# Patient Record
Sex: FEMALE | Race: WHITE | NOT HISPANIC OR LATINO | URBAN - METROPOLITAN AREA
[De-identification: names, ages, dates, MRNs, and addresses within clinical notes are randomized per-mention and may not be internally consistent; named-entity substitution may affect disease eponyms.]

---

## 2020-08-12 ENCOUNTER — EMERGENCY (EMERGENCY)
Facility: HOSPITAL | Age: 85
LOS: 1 days | Discharge: HOME | End: 2020-08-12
Attending: EMERGENCY MEDICINE
Payer: MEDICARE

## 2020-08-12 VITALS
TEMPERATURE: 98 F | SYSTOLIC BLOOD PRESSURE: 123 MMHG | WEIGHT: 197.98 LBS | HEIGHT: 61 IN | DIASTOLIC BLOOD PRESSURE: 76 MMHG | HEART RATE: 71 BPM | OXYGEN SATURATION: 95 % | RESPIRATION RATE: 18 BRPM

## 2020-08-12 PROCEDURE — 99220: CPT

## 2020-08-12 PROCEDURE — 70450 CT HEAD/BRAIN W/O DYE: CPT | Mod: 26

## 2020-08-12 PROCEDURE — 72125 CT NECK SPINE W/O DYE: CPT | Mod: 26

## 2020-08-12 RX ORDER — METOPROLOL TARTRATE 50 MG
50 TABLET ORAL DAILY
Refills: 0 | Status: DISCONTINUED | OUTPATIENT
Start: 2020-08-12 | End: 2020-08-17

## 2020-08-12 RX ORDER — MONTELUKAST 4 MG/1
10 TABLET, CHEWABLE ORAL DAILY
Refills: 0 | Status: DISCONTINUED | OUTPATIENT
Start: 2020-08-12 | End: 2020-08-17

## 2020-08-12 RX ORDER — RALOXIFENE HYDROCHLORIDE 60 MG/1
60 TABLET, COATED ORAL DAILY
Refills: 0 | Status: DISCONTINUED | OUTPATIENT
Start: 2020-08-12 | End: 2020-08-17

## 2020-08-12 RX ORDER — QUETIAPINE FUMARATE 200 MG/1
100 TABLET, FILM COATED ORAL AT BEDTIME
Refills: 0 | Status: DISCONTINUED | OUTPATIENT
Start: 2020-08-12 | End: 2020-08-17

## 2020-08-12 RX ORDER — SERTRALINE 25 MG/1
50 TABLET, FILM COATED ORAL DAILY
Refills: 0 | Status: DISCONTINUED | OUTPATIENT
Start: 2020-08-12 | End: 2020-08-17

## 2020-08-12 RX ORDER — AMLODIPINE BESYLATE 2.5 MG/1
10 TABLET ORAL DAILY
Refills: 0 | Status: DISCONTINUED | OUTPATIENT
Start: 2020-08-12 | End: 2020-08-17

## 2020-08-12 RX ORDER — LEVOTHYROXINE SODIUM 125 MCG
112 TABLET ORAL DAILY
Refills: 0 | Status: DISCONTINUED | OUTPATIENT
Start: 2020-08-12 | End: 2020-08-17

## 2020-08-12 RX ORDER — LORATADINE 10 MG/1
10 TABLET ORAL DAILY
Refills: 0 | Status: DISCONTINUED | OUTPATIENT
Start: 2020-08-12 | End: 2020-08-17

## 2020-08-12 RX ORDER — ACETAMINOPHEN 500 MG
650 TABLET ORAL ONCE
Refills: 0 | Status: COMPLETED | OUTPATIENT
Start: 2020-08-12 | End: 2020-08-12

## 2020-08-12 RX ORDER — FERROUS SULFATE 325(65) MG
325 TABLET ORAL DAILY
Refills: 0 | Status: DISCONTINUED | OUTPATIENT
Start: 2020-08-12 | End: 2020-08-17

## 2020-08-12 RX ORDER — HALOPERIDOL DECANOATE 100 MG/ML
1 INJECTION INTRAMUSCULAR ONCE
Refills: 0 | Status: DISCONTINUED | OUTPATIENT
Start: 2020-08-12 | End: 2020-08-12

## 2020-08-12 RX ORDER — PANTOPRAZOLE SODIUM 20 MG/1
40 TABLET, DELAYED RELEASE ORAL
Refills: 0 | Status: DISCONTINUED | OUTPATIENT
Start: 2020-08-12 | End: 2020-08-17

## 2020-08-12 RX ORDER — SIMVASTATIN 20 MG/1
20 TABLET, FILM COATED ORAL AT BEDTIME
Refills: 0 | Status: DISCONTINUED | OUTPATIENT
Start: 2020-08-12 | End: 2020-08-17

## 2020-08-12 RX ORDER — DULOXETINE HYDROCHLORIDE 30 MG/1
60 CAPSULE, DELAYED RELEASE ORAL DAILY
Refills: 0 | Status: DISCONTINUED | OUTPATIENT
Start: 2020-08-12 | End: 2020-08-17

## 2020-08-12 RX ADMIN — QUETIAPINE FUMARATE 100 MILLIGRAM(S): 200 TABLET, FILM COATED ORAL at 21:32

## 2020-08-12 RX ADMIN — Medication 1 MILLIGRAM(S): at 21:32

## 2020-08-12 RX ADMIN — Medication 1 MILLIGRAM(S): at 11:40

## 2020-08-12 RX ADMIN — Medication 650 MILLIGRAM(S): at 10:23

## 2020-08-12 RX ADMIN — Medication 1 MILLIGRAM(S): at 18:54

## 2020-08-12 RX ADMIN — SIMVASTATIN 20 MILLIGRAM(S): 20 TABLET, FILM COATED ORAL at 21:33

## 2020-08-12 NOTE — ED ADULT TRIAGE NOTE - CHIEF COMPLAINT QUOTE
pt BIBA from [facility] s/p unwitnessed mechanical fall from standing height. pt states she tripped and fell and hit the back of her head. but denies pain at this time, denies loc, denies blood thinners. baseline mental status at this time. no external signs of injury or trauma.

## 2020-08-12 NOTE — ED CDU PROVIDER INITIAL DAY NOTE - DETAILS
85 year old female with PMHx HTN, HLD, schizoaffective presents to the ED s/p fall. CT head and neck neg. Pending COVID for placement back to facility.

## 2020-08-12 NOTE — ED ADULT NURSE NOTE - CAS ELECT INFOMATION PROVIDED
DC instructions/DC instructions faxed by FIOR NORRIS. Patient calm and cooperative. Transferred via stretcher.

## 2020-08-12 NOTE — ED ADULT NURSE NOTE - OBJECTIVE STATEMENT
pt perri from [facility] s/p a trip and fall this morning, hitting the back of her head, denies loc, no obv deformities or active bleeding noted. a+ox2-3 pleasantly confused, denies any other complaints at this time.

## 2020-08-12 NOTE — ED PROVIDER NOTE - PROGRESS NOTE DETAILS
CT head and cervical spine negative for acute fracture. will place in Obs pending COVID-19 result. 05-Jan-2017

## 2020-08-12 NOTE — ED ADULT NURSE NOTE - CHIEF COMPLAINT QUOTE
pt BIBA from [facility] s/p unwitnessed mechanical fall from standing height. pt states she tripped and fell and hit the back of her head. but denies pain at this time, denies loc, denies blood thinners. baseline mental status at this time. no external signs of injury or trauma.

## 2020-08-12 NOTE — ED PROVIDER NOTE - NS ED ROS FT
Review of Systems  •	CONSTITUTIONAL - no  fever, no diaphoresis, no weight change  •	SKIN - no rash  •	HEMATOLOGIC - no bleeding, no bruising  •	EYES - no eye pain, no blurred vision  •	ENT - no change in hearing, no pain  •	RESPIRATORY - no shortness of breath, no cough  •	CARDIAC - no chest pain, no palpitations  •	GI - no abd pain, no nausea, no vomiting, no diarrhea, no constipation, no bleeding  •	GENITO-URINARY - no discharge, no dysuria; no hematuria,   •	ENDO - no polydipsia, no polyuria, no heat/no cold intolerance  •	MUSCULOSKELETAL - no joint pain, no swelling, no redness  •	NEUROLOGIC - no weakness, (+)  headache, no anesthesia, no paresthesias  •	PSYCH - no anxiety, non suicidal, non homicidal, no hallucination, no depression

## 2020-08-12 NOTE — ED ADULT NURSE NOTE - CHPI ED NUR SYMPTOMS NEG
no bleeding/no confusion/no deformity/no tingling/no fever/no loss of consciousness/no numbness/no vomiting/no weakness

## 2020-08-12 NOTE — ED ADULT NURSE NOTE - PMH
Anemia    Depression    Dyslipidemia    Elevated cholesterol    HTN (hypertension)    Insomnia    Schizoaffective disorder

## 2020-08-12 NOTE — ED CDU PROVIDER INITIAL DAY NOTE - ATTENDING CONTRIBUTION TO CARE
I, Deangelo Manzanares, performed the initial face to face bedside interview with this patient regarding history of present illness, review of symptoms and relevant past medical, social and family history.  I completed an independent physical examination.  I was the initial provider who evaluated this patient. I have signed out the follow up of any pending tests (i.e. labs, radiological studies) to the ACP.  I have communicated the patient’s plan of care and disposition with the ACP.

## 2020-08-12 NOTE — ED CDU PROVIDER INITIAL DAY NOTE - OBJECTIVE STATEMENT
86 yo F, sent in from [facility] assisted [facility], hx of HTN, hypothyroid, anemia, HDL, insomnia, depression, schizoaffective, osteopetrosis p/w headache s/p mechanical fall today. Patient denies chest pain or dizziness. no loc. not on blood thinner per med list. DNR per living will.

## 2020-08-12 NOTE — ED ADULT NURSE NOTE - NSIMPLEMENTINTERV_GEN_ALL_ED
Implemented All Fall with Harm Risk Interventions:  [place] to call system. Call bell, personal items and telephone within reach. Instruct patient to call for assistance. Room bathroom lighting operational. Non-slip footwear when patient is off stretcher. Physically safe environment: no spills, clutter or unnecessary equipment. Stretcher in lowest position, wheels locked, appropriate side rails in place. Provide visual cue, wrist band, yellow gown, etc. Monitor gait and stability. Monitor for mental status changes and reorient to person, place, and time. Review medications for side effects contributing to fall risk. Reinforce activity limits and safety measures with patient and family. Provide visual clues: red socks.

## 2020-08-12 NOTE — ED ADULT TRIAGE NOTE - DOMESTIC TRAVEL HIGH RISK QUESTION
Note Text (......Xxx Chief Complaint.): This diagnosis correlates with the
Detail Level: Zone
Other (Free Text): Pt premedicated one hr prior to procedure
No

## 2020-08-12 NOTE — ED PROVIDER NOTE - PHYSICAL EXAMINATION
VITAL SIGNS: I have reviewed nursing notes and confirm.  CONSTITUTIONAL: Well-developed; well-nourished; in no acute distress.  SKIN: Skin exam is warm and dry, no acute rash.  HEAD: Normocephalic; No ecchymosis and no hematoma of posterior head   EYES: PERRL, EOM intact; conjunctiva and sclera clear.  ENT: No nasal discharge; airway clear. Throat clear.  NECK: Supple; non tender.    CARD: S1, S2 normal; Regular rate and rhythm.  RESP: No wheezes,  no rales or rhonchi.   ABD:  soft; non-distended; non-tender;   EXT: Normal ROM. No clubbing, cyanosis or edema.  NEURO: AO x 2. Grossly unremarkable. No focal deficits. no facial droop, moves all extremities,    PSYCH: Cooperative, appropriate.

## 2020-08-12 NOTE — ED PROVIDER NOTE - CLINICAL SUMMARY MEDICAL DECISION MAKING FREE TEXT BOX
84 yo F from [facility] living s/p fall. mild headache, no other complaints. CT head. tyelnol. COVID-19 result pending. 86 [facility] F from [facility] s/p fall. mild headache, no other complaints. CT head and cervical head negative for traumatic injury. CT cervical neck showed vallecular cyst. patient tolerating PO. tyelnol given. will place in Obs pendoing COVID-19 result prior to discharge.

## 2020-08-13 VITALS
SYSTOLIC BLOOD PRESSURE: 138 MMHG | TEMPERATURE: 97 F | OXYGEN SATURATION: 94 % | HEART RATE: 74 BPM | DIASTOLIC BLOOD PRESSURE: 84 MMHG | RESPIRATION RATE: 20 BRPM

## 2020-08-13 LAB — SARS-COV-2 RNA SPEC QL NAA+PROBE: SIGNIFICANT CHANGE UP

## 2020-08-13 PROCEDURE — U0003: CPT

## 2020-08-13 PROCEDURE — G0378: CPT

## 2020-08-13 PROCEDURE — 99217: CPT

## 2020-08-13 PROCEDURE — 99284 EMERGENCY DEPT VISIT MOD MDM: CPT | Mod: 25

## 2020-08-13 PROCEDURE — 72125 CT NECK SPINE W/O DYE: CPT

## 2020-08-13 PROCEDURE — 96374 THER/PROPH/DIAG INJ IV PUSH: CPT

## 2020-08-13 PROCEDURE — 96372 THER/PROPH/DIAG INJ SC/IM: CPT | Mod: XU

## 2020-08-13 PROCEDURE — 70450 CT HEAD/BRAIN W/O DYE: CPT

## 2020-08-13 RX ORDER — HALOPERIDOL DECANOATE 100 MG/ML
1 INJECTION INTRAMUSCULAR ONCE
Refills: 0 | Status: COMPLETED | OUTPATIENT
Start: 2020-08-13 | End: 2020-08-13

## 2020-08-13 RX ADMIN — AMLODIPINE BESYLATE 10 MILLIGRAM(S): 2.5 TABLET ORAL at 06:48

## 2020-08-13 RX ADMIN — Medication 112 MICROGRAM(S): at 06:48

## 2020-08-13 RX ADMIN — HALOPERIDOL DECANOATE 1 MILLIGRAM(S): 100 INJECTION INTRAMUSCULAR at 00:12

## 2020-08-13 RX ADMIN — PANTOPRAZOLE SODIUM 40 MILLIGRAM(S): 20 TABLET, DELAYED RELEASE ORAL at 06:48

## 2020-08-13 RX ADMIN — Medication 50 MILLIGRAM(S): at 06:48

## 2020-08-13 NOTE — ED ADULT NURSE REASSESSMENT NOTE - NS ED NURSE REASSESS COMMENT FT1
Care endorsed to Aparna MCCONNELL. Patient in NAD. 1:1 in place. Resting in comfort. VSS. RN with no further questions.
Assumed care of the patient at 0730. Verbal report received from Aparna MCCONNELL. Patient sleeping in bed. Easily arousable. VSS. Patient dc. Pending ambulance  back to [facility]. 1:1 in place fo5r safety, Fall precautions maintained. Frequent T&P. Patient Call bell within reach and encouraged to use when assistance needed. Will continue to monitor.
Assumed care of Pt 1500 from RN Ayesha. Pt AOx2 pleasantly confused in no acute distress. Pt pending COVID swab results. No further questions for the RN plan of care explained will continue to monitor.

## 2020-08-13 NOTE — ED CDU PROVIDER DISPOSITION NOTE - ATTENDING CONTRIBUTION TO CARE
In ED had CT head and c-spine which were negative. Obs for covid swab prior to return to [facility]. Covid Negative. Pt. awake and alert. Pt. stable to return to the nursing home. I, Dr. Holley, performed a face to face bedside interview with this patient regarding history of present illness, review of symptoms and relevant past medical, social and family history.  I completed an independent physical examination.  I have also reviewed the ACP's note(s) and discussed the plan with the ACP.

## 2020-08-13 NOTE — ED CDU PROVIDER SUBSEQUENT DAY NOTE - NS ED ROS FT
Gen: denies fever, chills, fatigue, weight loss  Skin: denies rashes, laceration, bruising  HEENT: denies visual changes, ear pain, nasal congestion, throat pain  Respiratory: denies PARRA, SOB, cough, wheezing  Cardiovascular: denies chest pain, palpitations, diaphoresis, LE edema  GI: denies abdominal pain, n/v/d  : denies dysuria, frequency, urgency, bowel/bladder incontinence  MSK: denies joint swelling/pain, back pain, neck pain  Neuro: denies headache, dizziness, weakness, numbness  Psych: denies anxiety, depression, SI/HI, visual/auditory hallucinations

## 2020-08-13 NOTE — CHART NOTE - NSCHARTNOTEFT_GEN_A_CORE
AMI received call this AM that pt was supposed to be picked up at 8:30am by CHRISTIANO. AMI completed chart review and noticed [facility] address- AMI requested that RN put d.c on hold until all paperwork could be completed. AMI contacted [facility] [phone] (spoke with Farida) who reported pt can return once neg COVID and d/c summary is sent, reported short form was not needed due to such an acute stay for COVID testing. AMI contacted EMS to ensure transport is properly scheduled, EMS reported that due to psych hx they are waiting for a female worker and will be there to p/u pt at 11am. AMI called pt son Weston ([phone]) who is aware pt will return to [facility] this morning, pt aware and agreeable. No other SW concerns at this time.

## 2020-08-13 NOTE — ED CDU PROVIDER DISPOSITION NOTE - CLINICAL COURSE
84yo F pmhx of HTN, hypothyroid, anemia, HDL, insomnia, depression, schizoaffective, osteoporosis sent in from [facility] assisted living for fall with head injury. In ED had CT head and c-spine which were negative. Obs for covid swab prior to return to [facility]. Covid Negative. I spoke to unit clerk at [facility], they are aware pt is negative and will be returning to facility. [facility] transfer arranged. Pt given copy of results indicating she is covid negative. Stable for dc.

## 2020-08-13 NOTE — ED CDU PROVIDER DISPOSITION NOTE - NS ED ATTENDING STATEMENT MOD
CERTIFICATE OF SCHOOL    March 19, 2019      Re: Maira Mchugh  [id number] [facility]  [place] [id number]      This is to certify that Maira Mchugh has been under my care from 3/19/2019. Please excuse from school 3/19 through 3/22/19. SIGNATURE:___________________________________________,   3/19/2019  Dr. Candelario Munoz  [address] [place] [address].    [address]  [phone]
CERTIFICATE OF WORK    March 19, 2019      Re: Luis F Cain  [id number] [facility]  [place] [id number]      This is to certify that Luis F Cain has been under my care from 3/19/2019. Please excuse mom, Champ Closs, from work 3/19 through 3/22/19 due to child's illness. SIGNATURE:___________________________________________,   3/19/2019  Dr. Forde Files  [address] [facility].    [address]  [phone]
I have personally performed a face to face diagnostic evaluation on this patient. I have reviewed the ACP note and agree with the history, exam and plan of care, except as noted.

## 2020-08-13 NOTE — ED CDU PROVIDER SUBSEQUENT DAY NOTE - HISTORY
Called by nurse pt is getting out of bed, asking when is she going home. Pt sitting in chair yelling she wants to go home. 1mg haldol given, pt now calm, resting more comfortably. Pending covid result and transport back to [facility]

## 2020-08-13 NOTE — ED CDU PROVIDER DISPOSITION NOTE - NSFOLLOWUPINSTRUCTIONS_ED_ALL_ED_FT
- Follow up with your doctor within 2-3 days.   - Return to the ED for any new or worsening symptoms.  - You were tested for Covid during your Emergency Department stay, you are negative for Covid. (results included)

## 2020-08-13 NOTE — ED ADULT NURSE REASSESSMENT NOTE - COMFORT CARE
plan of care explained/side rails up/wait time explained/repositioned
wait time explained/plan of care explained

## 2020-08-13 NOTE — ED CDU PROVIDER SUBSEQUENT DAY NOTE - ATTENDING CONTRIBUTION TO CARE
Pending results of covid and transport back to [facility] if negative. Pt. resting comfortably. I, Dr. Holley, performed a face to face bedside interview with this patient regarding history of present illness, review of symptoms and relevant past medical, social and family history.  I completed an independent physical examination.  I have also reviewed the ACP's note(s) and discussed the plan with the ACP.

## 2020-08-13 NOTE — ED CDU PROVIDER SUBSEQUENT DAY NOTE - PHYSICAL EXAMINATION
Gen: WD/WN, NAD  Head: NCAT  Cardiac: RRR +S1S2.   Resp: CTAB  Abd: +BS x 4. Soft, non-tender, non-distended. No guarding or rebound.  Back: Spine midline and non-tender. No CVAT.  MSK: FROM extremities x 4, no bony ttp  Skin: Warm, dry  Neuro: Awake, alert & oriented x 3. No focal deficits

## 2020-08-13 NOTE — ED CDU PROVIDER DISPOSITION NOTE - PATIENT PORTAL LINK FT
You can access the FollowMyHealth Patient Portal offered by [facility] by registering at the following website: [email or web address]. By joining [facility]’s FollowMyHealth portal, you will also be able to view your health information using other applications (apps) compatible with our system.

## 2023-03-10 NOTE — ED ADULT NURSE NOTE - CAS DISCH TRANSFER METHOD
----- Message from Karen Alfonso DO sent at 3/10/2023 11:45 AM EST -----  [facility] let pt know that cholesterol is slightly elevated, will improve with low carb/low fat diet. Otherwise labs within normal range    Ambulance